# Patient Record
Sex: MALE | Race: WHITE | URBAN - METROPOLITAN AREA
[De-identification: names, ages, dates, MRNs, and addresses within clinical notes are randomized per-mention and may not be internally consistent; named-entity substitution may affect disease eponyms.]

---

## 2018-05-31 ENCOUNTER — TRANSFERRED RECORDS (OUTPATIENT)
Dept: HEALTH INFORMATION MANAGEMENT | Facility: CLINIC | Age: 58
End: 2018-05-31

## 2018-06-01 ENCOUNTER — HOSPITAL ENCOUNTER (OUTPATIENT)
Facility: CLINIC | Age: 58
Setting detail: OBSERVATION
Discharge: HOME | End: 2018-06-02
Attending: INTERNAL MEDICINE | Admitting: INTERNAL MEDICINE
Payer: COMMERCIAL

## 2018-06-01 DIAGNOSIS — N30.90 CYSTITIS: Primary | ICD-10-CM

## 2018-06-01 PROBLEM — R33.9 URINARY RETENTION: Status: ACTIVE | Noted: 2018-06-01

## 2018-06-01 LAB
ALBUMIN SERPL-MCNC: 2.9 G/DL (ref 3.4–5)
ALP SERPL-CCNC: 93 U/L (ref 40–150)
ALT SERPL W P-5'-P-CCNC: 37 U/L (ref 0–70)
ANION GAP SERPL CALCULATED.3IONS-SCNC: 9 MMOL/L (ref 3–14)
AST SERPL W P-5'-P-CCNC: 16 U/L (ref 0–45)
BILIRUB SERPL-MCNC: 0.7 MG/DL (ref 0.2–1.3)
BUN SERPL-MCNC: 20 MG/DL (ref 7–30)
CALCIUM SERPL-MCNC: 8.7 MG/DL (ref 8.5–10.1)
CHLORIDE SERPL-SCNC: 103 MMOL/L (ref 94–109)
CO2 SERPL-SCNC: 23 MMOL/L (ref 20–32)
CREAT SERPL-MCNC: 1.24 MG/DL (ref 0.66–1.25)
ERYTHROCYTE [DISTWIDTH] IN BLOOD BY AUTOMATED COUNT: 13.7 % (ref 10–15)
GFR SERPL CREATININE-BSD FRML MDRD: 60 ML/MIN/1.7M2
GLUCOSE SERPL-MCNC: 124 MG/DL (ref 70–99)
HCT VFR BLD AUTO: 38.7 % (ref 40–53)
HGB BLD-MCNC: 13 G/DL (ref 13.3–17.7)
MCH RBC QN AUTO: 30.3 PG (ref 26.5–33)
MCHC RBC AUTO-ENTMCNC: 33.6 G/DL (ref 31.5–36.5)
MCV RBC AUTO: 90 FL (ref 78–100)
PLATELET # BLD AUTO: 335 10E9/L (ref 150–450)
POTASSIUM SERPL-SCNC: 4.1 MMOL/L (ref 3.4–5.3)
PROT SERPL-MCNC: 6.6 G/DL (ref 6.8–8.8)
RBC # BLD AUTO: 4.29 10E12/L (ref 4.4–5.9)
SODIUM SERPL-SCNC: 135 MMOL/L (ref 133–144)
WBC # BLD AUTO: 24.8 10E9/L (ref 4–11)

## 2018-06-01 PROCEDURE — G0378 HOSPITAL OBSERVATION PER HR: HCPCS

## 2018-06-01 PROCEDURE — 36415 COLL VENOUS BLD VENIPUNCTURE: CPT | Performed by: INTERNAL MEDICINE

## 2018-06-01 PROCEDURE — 85027 COMPLETE CBC AUTOMATED: CPT | Performed by: INTERNAL MEDICINE

## 2018-06-01 PROCEDURE — 25000128 H RX IP 250 OP 636: Performed by: INTERNAL MEDICINE

## 2018-06-01 PROCEDURE — 96374 THER/PROPH/DIAG INJ IV PUSH: CPT

## 2018-06-01 PROCEDURE — 25000125 ZZHC RX 250

## 2018-06-01 PROCEDURE — 99223 1ST HOSP IP/OBS HIGH 75: CPT | Mod: AI | Performed by: INTERNAL MEDICINE

## 2018-06-01 PROCEDURE — 25000132 ZZH RX MED GY IP 250 OP 250 PS 637: Performed by: INTERNAL MEDICINE

## 2018-06-01 PROCEDURE — 25000125 ZZHC RX 250: Performed by: INTERNAL MEDICINE

## 2018-06-01 PROCEDURE — 80053 COMPREHEN METABOLIC PANEL: CPT | Performed by: INTERNAL MEDICINE

## 2018-06-01 RX ORDER — HYDROMORPHONE HYDROCHLORIDE 1 MG/ML
.3-.5 INJECTION, SOLUTION INTRAMUSCULAR; INTRAVENOUS; SUBCUTANEOUS
Status: DISCONTINUED | OUTPATIENT
Start: 2018-06-01 | End: 2018-06-02 | Stop reason: HOSPADM

## 2018-06-01 RX ORDER — LIDOCAINE 40 MG/G
CREAM TOPICAL
Status: DISCONTINUED | OUTPATIENT
Start: 2018-06-01 | End: 2018-06-02 | Stop reason: HOSPADM

## 2018-06-01 RX ORDER — PROCHLORPERAZINE 25 MG
25 SUPPOSITORY, RECTAL RECTAL EVERY 12 HOURS PRN
Status: DISCONTINUED | OUTPATIENT
Start: 2018-06-01 | End: 2018-06-02 | Stop reason: HOSPADM

## 2018-06-01 RX ORDER — NICOTINE 21 MG/24HR
1 PATCH, TRANSDERMAL 24 HOURS TRANSDERMAL DAILY
Status: DISCONTINUED | OUTPATIENT
Start: 2018-06-01 | End: 2018-06-02 | Stop reason: HOSPADM

## 2018-06-01 RX ORDER — MAGNESIUM SULFATE HEPTAHYDRATE 40 MG/ML
4 INJECTION, SOLUTION INTRAVENOUS EVERY 4 HOURS PRN
Status: DISCONTINUED | OUTPATIENT
Start: 2018-06-01 | End: 2018-06-02 | Stop reason: HOSPADM

## 2018-06-01 RX ORDER — POTASSIUM CHLORIDE 29.8 MG/ML
20 INJECTION INTRAVENOUS
Status: DISCONTINUED | OUTPATIENT
Start: 2018-06-01 | End: 2018-06-02 | Stop reason: HOSPADM

## 2018-06-01 RX ORDER — AMOXICILLIN 250 MG
1 CAPSULE ORAL 2 TIMES DAILY PRN
Status: DISCONTINUED | OUTPATIENT
Start: 2018-06-01 | End: 2018-06-02 | Stop reason: HOSPADM

## 2018-06-01 RX ORDER — AMOXICILLIN 250 MG
1 CAPSULE ORAL 2 TIMES DAILY
Status: DISCONTINUED | OUTPATIENT
Start: 2018-06-01 | End: 2018-06-02 | Stop reason: HOSPADM

## 2018-06-01 RX ORDER — ACETAMINOPHEN 650 MG/1
650 SUPPOSITORY RECTAL EVERY 4 HOURS PRN
Status: DISCONTINUED | OUTPATIENT
Start: 2018-06-01 | End: 2018-06-02 | Stop reason: HOSPADM

## 2018-06-01 RX ORDER — BISACODYL 10 MG
10 SUPPOSITORY, RECTAL RECTAL DAILY PRN
Status: DISCONTINUED | OUTPATIENT
Start: 2018-06-01 | End: 2018-06-02 | Stop reason: HOSPADM

## 2018-06-01 RX ORDER — ATORVASTATIN CALCIUM 20 MG/1
20 TABLET, FILM COATED ORAL DAILY
Status: DISCONTINUED | OUTPATIENT
Start: 2018-06-01 | End: 2018-06-02 | Stop reason: HOSPADM

## 2018-06-01 RX ORDER — NALOXONE HYDROCHLORIDE 0.4 MG/ML
.1-.4 INJECTION, SOLUTION INTRAMUSCULAR; INTRAVENOUS; SUBCUTANEOUS
Status: DISCONTINUED | OUTPATIENT
Start: 2018-06-01 | End: 2018-06-02 | Stop reason: HOSPADM

## 2018-06-01 RX ORDER — AMOXICILLIN 250 MG
2 CAPSULE ORAL 2 TIMES DAILY
Status: DISCONTINUED | OUTPATIENT
Start: 2018-06-01 | End: 2018-06-02 | Stop reason: HOSPADM

## 2018-06-01 RX ORDER — PROCHLORPERAZINE MALEATE 10 MG
10 TABLET ORAL EVERY 6 HOURS PRN
Status: DISCONTINUED | OUTPATIENT
Start: 2018-06-01 | End: 2018-06-02 | Stop reason: HOSPADM

## 2018-06-01 RX ORDER — POTASSIUM CHLORIDE 1500 MG/1
20-40 TABLET, EXTENDED RELEASE ORAL
Status: DISCONTINUED | OUTPATIENT
Start: 2018-06-01 | End: 2018-06-02 | Stop reason: HOSPADM

## 2018-06-01 RX ORDER — LOSARTAN POTASSIUM 50 MG/1
50 TABLET ORAL DAILY
Status: DISCONTINUED | OUTPATIENT
Start: 2018-06-01 | End: 2018-06-02 | Stop reason: HOSPADM

## 2018-06-01 RX ORDER — AMOXICILLIN 250 MG
2 CAPSULE ORAL 2 TIMES DAILY PRN
Status: DISCONTINUED | OUTPATIENT
Start: 2018-06-01 | End: 2018-06-02 | Stop reason: HOSPADM

## 2018-06-01 RX ORDER — ATROPA BELLADONNA AND OPIUM 16.2; 6 MG/1; MG/1
60 SUPPOSITORY RECTAL EVERY 8 HOURS PRN
Status: DISCONTINUED | OUTPATIENT
Start: 2018-06-01 | End: 2018-06-02 | Stop reason: HOSPADM

## 2018-06-01 RX ORDER — ONDANSETRON 2 MG/ML
4 INJECTION INTRAMUSCULAR; INTRAVENOUS EVERY 6 HOURS PRN
Status: DISCONTINUED | OUTPATIENT
Start: 2018-06-01 | End: 2018-06-02 | Stop reason: HOSPADM

## 2018-06-01 RX ORDER — CEFTRIAXONE 1 G/1
1 INJECTION, POWDER, FOR SOLUTION INTRAMUSCULAR; INTRAVENOUS EVERY 24 HOURS
Status: DISCONTINUED | OUTPATIENT
Start: 2018-06-01 | End: 2018-06-02 | Stop reason: HOSPADM

## 2018-06-01 RX ORDER — POTASSIUM CHLORIDE 1.5 G/1.58G
20-40 POWDER, FOR SOLUTION ORAL
Status: DISCONTINUED | OUTPATIENT
Start: 2018-06-01 | End: 2018-06-02 | Stop reason: HOSPADM

## 2018-06-01 RX ORDER — POLYETHYLENE GLYCOL 3350 17 G/17G
17 POWDER, FOR SOLUTION ORAL DAILY PRN
Status: DISCONTINUED | OUTPATIENT
Start: 2018-06-01 | End: 2018-06-02 | Stop reason: HOSPADM

## 2018-06-01 RX ORDER — POTASSIUM CL/LIDO/0.9 % NACL 10MEQ/0.1L
10 INTRAVENOUS SOLUTION, PIGGYBACK (ML) INTRAVENOUS
Status: DISCONTINUED | OUTPATIENT
Start: 2018-06-01 | End: 2018-06-02 | Stop reason: HOSPADM

## 2018-06-01 RX ORDER — ONDANSETRON 4 MG/1
4 TABLET, ORALLY DISINTEGRATING ORAL EVERY 6 HOURS PRN
Status: DISCONTINUED | OUTPATIENT
Start: 2018-06-01 | End: 2018-06-02 | Stop reason: HOSPADM

## 2018-06-01 RX ORDER — ACETAMINOPHEN 325 MG/1
650 TABLET ORAL EVERY 4 HOURS PRN
Status: DISCONTINUED | OUTPATIENT
Start: 2018-06-01 | End: 2018-06-02 | Stop reason: HOSPADM

## 2018-06-01 RX ORDER — CITALOPRAM HYDROBROMIDE 20 MG/1
40 TABLET ORAL DAILY
Status: DISCONTINUED | OUTPATIENT
Start: 2018-06-01 | End: 2018-06-02 | Stop reason: HOSPADM

## 2018-06-01 RX ORDER — HYDROCODONE BITARTRATE AND ACETAMINOPHEN 5; 325 MG/1; MG/1
1 TABLET ORAL EVERY 6 HOURS PRN
COMMUNITY

## 2018-06-01 RX ORDER — POTASSIUM CHLORIDE 7.45 MG/ML
10 INJECTION INTRAVENOUS
Status: DISCONTINUED | OUTPATIENT
Start: 2018-06-01 | End: 2018-06-02 | Stop reason: HOSPADM

## 2018-06-01 RX ORDER — OXYCODONE HYDROCHLORIDE 5 MG/1
5-10 TABLET ORAL
Status: DISCONTINUED | OUTPATIENT
Start: 2018-06-01 | End: 2018-06-02 | Stop reason: HOSPADM

## 2018-06-01 RX ORDER — BISACODYL 10 MG
10 SUPPOSITORY, RECTAL RECTAL DAILY PRN
Status: DISCONTINUED | OUTPATIENT
Start: 2018-06-01 | End: 2018-06-01

## 2018-06-01 RX ORDER — POLYETHYLENE GLYCOL 3350 17 G/17G
17 POWDER, FOR SOLUTION ORAL DAILY PRN
Status: DISCONTINUED | OUTPATIENT
Start: 2018-06-01 | End: 2018-06-01

## 2018-06-01 RX ADMIN — ONDANSETRON 4 MG: 4 TABLET, ORALLY DISINTEGRATING ORAL at 05:34

## 2018-06-01 RX ADMIN — LIDOCAINE HYDROCHLORIDE 10 ML: 20 JELLY TOPICAL at 07:58

## 2018-06-01 RX ADMIN — CEFTRIAXONE 1 G: 1 INJECTION, POWDER, FOR SOLUTION INTRAMUSCULAR; INTRAVENOUS at 17:57

## 2018-06-01 RX ADMIN — SODIUM CHLORIDE 1000 ML: 9 INJECTION, SOLUTION INTRAVENOUS at 05:03

## 2018-06-01 RX ADMIN — HYDROMORPHONE HYDROCHLORIDE 0.5 MG: 1 INJECTION, SOLUTION INTRAMUSCULAR; INTRAVENOUS; SUBCUTANEOUS at 06:01

## 2018-06-01 RX ADMIN — LIDOCAINE HYDROCHLORIDE: 20 JELLY TOPICAL at 12:31

## 2018-06-01 RX ADMIN — ACETAMINOPHEN 650 MG: 325 TABLET, FILM COATED ORAL at 20:54

## 2018-06-01 RX ADMIN — CITALOPRAM HYDROBROMIDE 40 MG: 20 TABLET ORAL at 09:46

## 2018-06-01 RX ADMIN — ACETAMINOPHEN 650 MG: 325 TABLET, FILM COATED ORAL at 05:34

## 2018-06-01 RX ADMIN — Medication 5 MG: at 05:29

## 2018-06-01 RX ADMIN — SENNOSIDES AND DOCUSATE SODIUM 2 TABLET: 8.6; 5 TABLET ORAL at 09:46

## 2018-06-01 RX ADMIN — LOSARTAN POTASSIUM 50 MG: 50 TABLET ORAL at 09:46

## 2018-06-01 RX ADMIN — Medication 1 MG: at 20:54

## 2018-06-01 RX ADMIN — OXYCODONE HYDROCHLORIDE 5 MG: 5 TABLET ORAL at 05:29

## 2018-06-01 RX ADMIN — ATROPA BELLADONNA AND OPIUM 1 SUPPOSITORY: 16.2; 6 SUPPOSITORY RECTAL at 06:21

## 2018-06-01 RX ADMIN — NICOTINE 1 PATCH: 21 PATCH, EXTENDED RELEASE TRANSDERMAL at 09:45

## 2018-06-01 RX ADMIN — ATORVASTATIN CALCIUM 20 MG: 20 TABLET, FILM COATED ORAL at 09:46

## 2018-06-01 NOTE — H&P
[facility]    History and Physical  Hospitalist       Date of Admission:  6/1/2018    Assessment & Plan   Kandi Ozuna is a 57 year old male who presents as a direct admission from [place], [place] with complaints of suprapubic pain, fever to 101.7, nausea in the setting of radical prostatectomy by Dr. Guaman [facility] 10 days ago and Medina catheter removal earlier 5/31.    Abdominal pain: Suspect this is secondary to catheter related urinary tract infection as well as bladder spasm and postoperative pain from recent radical prostatectomy.  CT urogram reported without overt urine leak, though small amount of pelvic free fluid cannot rule out leak.  Suspected catheter related urinary tract infection: Patient with pyuria, fever to 101.7 reported.  Patient states he felt febrile even prior to Medina catheter removal 5/31/18 in urology office.  -Ceftriaxone 1 g every 24 hours  -[facility] consulted  -No Mednia catheter is being placed.  Any recommendation for placement of Medina catheter should come directly from urology given recent operative intervention.  -Adjustment of pain control regimen as per surgical service  -B & O suppositories  -Acetaminophen, oral oxycodone, IV Dilaudid if needed.  Would minimize IV narcotic use in order to transition to discharge.    Prostate adenocarcinoma: Hi 7, 0/4 lymph nodes involved.  As above, underwent radical prostatectomy by Dr. Guaman at [facility] 5/21/18.    Common bile duct dilation: It is possible that patient has an unrelated cholecystitis resulting in fever and nausea/abdominal pain.  This seems less likely given patient's abdominal discomfort is suprapubic and clearly associated with efforts to urinate.  Suspect common bile duct dilation without findings of stone noted on CT report is an incidental finding related to prolonged fasting.  -Hepatic panel pending for this a.m.  -Remains on ceftriaxone 1 g every 24 hours for  urinary tract infection    Hypertension:  -Continue Cozaar 50 mg daily    Hyperlipidemia:  -Continue atorvastatin 20 milligrams daily    Anxiety:  -Continue Celexa 40 mg daily  -1 time 5 mg Valium dose given on admission.    Tobacco dependence: Patient quit smoking approximately 10 days ago prior to his prostatectomy surgery.  -Continue nicotine patch at 21 mg per day     # Pain Assessment:   - Kandi is experiencing pain due to postoperative prostatectomy pain as well as what I suspect to be bladder spasm with cystitis. Pain management was discussed and the plan was created in a collaborative fashion.  Kandi's response to the current recommendations: compliant  Acetaminophen, oxycodone, IV Dilaudid as needed.    DVT Prophylaxis: Pneumatic Compression Devices    Code Status: Full Code    Disposition: Expected discharge in likely 1-2 days pending outside culture results and pain control    Brandon Blackwell [place]    Primary Care Physician   No primary care provider on file.    Chief Complaint   Abdominal pain    History is obtained from the patient, chart review, discussion with Dr. Armenta at [facility], review of outside records including recent admission for radical prostatectomy and pathology results    History of Present Illness   Kandi Ozuna is a 57 year old male who presents with fever, lower abdominal pain in the setting of recent radical prostatectomy 5/22/18 and Medina catheter removal 5/31/18.  Patient underwent radical [facility] at [facility] approximately 10 days ago.  Hospitalized for 2 days given difficulty with pain control prior to discharge.  Patient states that his appetite was poor and he had some ongoing abdominal pain which slowly improved following discharge, was feeling fairly well, and believes that he was recovering from surgery better than others might be expected to.  5/31, however, as patient was driving from [place] to [place] for  "urology follow-up, noted feeling somewhat febrile.  At urology appointment, patient had his Medina catheter removed and underwent a CT of his abdomen and pelvis.  Exact details of this are not clear as I am unable to access [facility] records currently.  Patient states he reported his fever, though no cultures were obtained to his knowledge.  Driving home from his urology appointment, patient stopped in [place] to get gas when he had sudden lower abdominal pain that caused him to double over.  Patient states the onset of pain was related to feeling as though he needed to urinate, though he was unable to do so.  States that this pain slowly improved over the course of one half hour.  Was unable to drive during this period given degree of discomfort.     Pain persisted, pt developed fever to 101.7 as measured at home.  Reports additional bouts of \"12/10\" pain which he relates to attempting to begin urination. Took tylenol, no longer febrile, presented to [facility] [facility]    Post void residual of 50 ml, difficult to control pain even with IV morphine and dilaudid. CT urogram w/o overt leak.  [facility] aware of patient from outside ER. Transferred to [facility] for urology evaluation.    Pt noted to have a leukocytosis to 28K as well as reported fever. Concern for CAUTI with pyuria noted at outside hospital.    Past Medical History    I have reviewed this patient's medical history and updated it with pertinent information if needed.   Hypertension, hyperlipidemia, Hi 7 prostate cancer    Past Surgical History   I have reviewed this patient's surgical history and updated it with pertinent information if needed.  Radical prostatectomy 5/22/18    Prior to Admission Medications   Cozaar  Celexa  Norco  Atorvastatin    Allergies   No Known Allergies    Social History   I have reviewed this patient's social history and updated it with pertinent information if needed. Kandi Ozuna quit smoking 1ppd approximately 2 " weeks ago.  Has been abstinent from alcohol for the past greater than 20 years.    Family History   I have reviewed this patient's family history and updated it with pertinent information if needed.   father with a history of lung cancer  Mother with significant COPD    Review of Systems   The 10 point Review of Systems is negative other than noted in the HPI or here.  Fever  No cough or shortness of breath (did have a cough in the immediate several days post op and post tobacco cessation)    Physical Exam                      Vital Signs with Ranges  Temp:  [98.5  F (36.9  C)-98.9  F (37.2  C)] 98.5  F (36.9  C)  Pulse:  [83-94] 94  Resp:  [16] 16  BP: (113-143)/(81-90) 143/90  SpO2:  [92 %] 92 %  0 lbs 0 oz    Constitutional: mild discomfort, alert, conversant  Eyes: no scleral icterus or injection  HEENT: moist mucous membranes  Respiratory: breath sounds clear bilaterally to auscultation, no wheezes, no crackles. Slightly prolonged expiratory phase  Cardiovascular: regular rate and rhythm, no murmur, distant  GI: abdomen soft, no upper quadrant tenderness to palpation.  Lower abdominal tenderness, no peritoneal signs  Lymph/Hematologic: no lower extremity swelling  Skin: no rashes  Musculoskeletal: muscular tone intact in all extremities  Neurologic: mental status grossly intact, no focal deficits, alert  Psychiatric: normal affect    Data   Data reviewed today:  I personally reviewed no images or EKG's today.  CT report from [place] system reviewed (post surgical inflammation, CBD of 11mm, small free fluid in pelvis)

## 2018-06-01 NOTE — PHARMACY-ADMISSION MEDICATION HISTORY
Admission medication history interview status for the 6/1/2018  admission is complete. See EPIC admission navigator for prior to admission medications     Medication history source reliability:Good    Actions taken by pharmacist (provider contacted, etc):  Interviewed patient and verified with ED Transfer Summary and pictures of med bottles on phone     Additional medication history information not noted on PTA med list :None    Medication reconciliation/reorder completed by provider prior to medication history? Yes    Time spent in this activity: 20 min    Prior to Admission medications    Medication Sig Last Dose Taking? Auth Provider   ATORVASTATIN CALCIUM PO Take 20 mg by mouth every evening 5/30/2018 at pm Yes Unknown, Entered By History   CITALOPRAM HYDROBROMIDE PO Take 40 mg by mouth every evening 5/30/2018 at pm Yes Unknown, Entered By History   HYDROcodone-acetaminophen (NORCO) 5-325 MG per tablet Take 1 tablet by mouth every 6 hours as needed for pain 5/23/2018 Yes Unknown, Entered By History   LOSARTAN POTASSIUM PO Take 50 mg by mouth daily 5/31/2018 at am Yes Unknown, Entered By History

## 2018-06-01 NOTE — PROGRESS NOTES
A/Ox4, VSS on RA. Abd mildly distended with lap sites. Steri strips intact, no drainage. PVR at 0655 was 415, will attempt to void. Pain 7-8/10, given IV, PO, B&O supp, ice and heat with minimal relief at this time. R PIV from [place] has blood return and is OK to use. Fluid bolus 1000ml complete. Urology to see this am.

## 2018-06-01 NOTE — PLAN OF CARE
Problem: Patient Care Overview  Goal: Plan of Care/Patient Progress Review  Outcome: No Change  Medina placed for urinary retention this morning by urology PA.  Urine has been clear vijaya.  No further c/o discomfort or pain.  Tmax 99.0.   WBC 24.8 which is decreased from WBC at [place] [facility] last evening. Continue IV rocephin.  Up ind in room.  Tolerating reg diet.  Will continue to monitor.

## 2018-06-01 NOTE — CONSULTS
[facility]    Urology Consultation     Date of Admission:  6/1/2018    Assessment & Plan   Kandi Ozuna is a 57 year old male who was admitted on 6/1/2018. I was asked to see the patient for suprapubic pain, fevers and elevated WBC s/p RALP on 5/21 with Dr. Guaman, becerra removed in office 5/31. Urinary retention, possible urine leak, possible UTI    Plan: Becerra replaced with 400cc output; continue becerra and follow up with Dr. Guaman next week for TOV.   Treat for UTI, recommend 7 days abx   Likely d/c home tomorrow if afebrile and labs normalizing     Mary Tripathi PA-C  [facility]  [address] [place] [id number]  [phone]  [email or web address]  Text Page (7am to 5pm)    Code Status    Full Code    Reason for Consult   Reason for consult: I was asked by Dr. Calvo to evaluate this patient for urinary retention and possible UTI s/p RALP and becerra removal.    Primary Care Physician   No primary care provider on file.    Chief Complaint   Suprapubic pain, fever    History is obtained from the patient    History of Present Illness   Kandi Ozuna is a 57 year old male who recently underwent RALP with Dr. Guaman on 5/21, becerra removed yesterday (5/31) without issue. CT cystogram obtained prior to becerra removal was without evidence of anastomosis leak. Patient reports that he stopped for gas on his way home following his appointment and felt the urge to void but was unable to do so and then began experiencing severe suprapubic pain. He presented to the ED in [place] yesterday evening with a fever of 101F and a reported leukocytosis of 28 and pyuria although I am unable to access these records currently. He was transferred to [facility] for further evaluation and treatment this AM. He was given a dose of IV rocephin prior to transfer.     Since admission to [facility], he was been afebrile. This AM, PVR was >400cc and he was straight cathed for 500cc clear  urine. Repeat labs obtained this AM with WBC 24, creat 1.24, hb 13. Reports he feels better now that his bladder is empty. Denies gross hematuria, current fever/chills, nausea/vomiting, SOB, calve tenderness, flank pain.     Past Medical History   I have reviewed this patient's medical history and updated it with pertinent information if needed.   No past medical history on file.    Past Surgical History   I have reviewed this patient's surgical history and updated it with pertinent information if needed.  No past surgical history on file.    Prior to Admission Medications   Prior to Admission Medications   Prescriptions Last Dose Informant Patient Reported? Taking?   ATORVASTATIN CALCIUM PO 5/30/2018 at pm Self Yes Yes   Sig: Take 20 mg by mouth every evening   CITALOPRAM HYDROBROMIDE PO 5/30/2018 at pm Self Yes Yes   Sig: Take 40 mg by mouth every evening   HYDROcodone-acetaminophen (NORCO) 5-325 MG per tablet 5/23/2018 Self Yes Yes   Sig: Take 1 tablet by mouth every 6 hours as needed for pain   LOSARTAN POTASSIUM PO 5/31/2018 at am Self Yes Yes   Sig: Take 50 mg by mouth daily      Facility-Administered Medications: None     Allergies   No Known Allergies    Social History   I have reviewed this patient's social history and updated it with pertinent information if needed. Kandi Ozuna      Family History   I have reviewed this patient's family history and updated it with pertinent information if needed.   No family history on file.    Review of Systems   The 10 point Review of Systems is negative other than noted in the HPI or here.     Physical Exam   Temp: 98.5  F (36.9  C) Temp src: Oral BP: 143/90 Pulse: 94   Resp: 16 SpO2: 92 % O2 Device: None (Room air)    Vital Signs with Ranges  Temp:  [98.5  F (36.9  C)-98.9  F (37.2  C)] 98.5  F (36.9  C)  Pulse:  [83-94] 94  Resp:  [16] 16  BP: (113-143)/(81-90) 143/90  SpO2:  [92 %] 92 %  143 lbs 0 oz    Constitutional: Sitting up in bed, NAD  Eyes: no  icterus  ENT: normocephalic, atraumatic   Respiratory: breathing unlabored   Cardiovascular: chest wall symmetric   GI: soft, NT, ND. No CVAT  Lymph/Hematologic: no pedal edema or calve tenderness  Genitourinary: no penoscrotal edema or tenderness. Becerra placed with 400cc clear urine on return.   Skin: well perfused   Neurologic: no focal deficits  Neuropsychiatric: A&Ox3    PROCEDURE: after sterile prep of the meatus with betadine, 20ml of viscous lidocaine jelly was introduced into the urethral and allowed approx 2-3 minutes to take effect. A 16F becerra catheter was then lubricated and inserted into the urethra and passed into the bladder without resistance. Clear urine was noted on return and the balloon was inflated with 10cc NS. 400cc of clear urine drained upon becerra insertion.     Data   Results for orders placed or performed during the hospital encounter of 06/01/18 (from the past 24 hour(s))   Comprehensive metabolic panel   Result Value Ref Range    Sodium 135 133 - 144 mmol/L    Potassium 4.1 3.4 - 5.3 mmol/L    Chloride 103 94 - 109 mmol/L    Carbon Dioxide 23 20 - 32 mmol/L    Anion Gap 9 3 - 14 mmol/L    Glucose 124 (H) 70 - 99 mg/dL    Urea Nitrogen 20 7 - 30 mg/dL    Creatinine 1.24 0.66 - 1.25 mg/dL    GFR Estimate 60 (L) >60 mL/min/1.7m2    GFR Estimate If Black 72 >60 mL/min/1.7m2    Calcium 8.7 8.5 - 10.1 mg/dL    Bilirubin Total 0.7 0.2 - 1.3 mg/dL    Albumin 2.9 (L) 3.4 - 5.0 g/dL    Protein Total 6.6 (L) 6.8 - 8.8 g/dL    Alkaline Phosphatase 93 40 - 150 U/L    ALT 37 0 - 70 U/L    AST 16 0 - 45 U/L   CBC with platelets   Result Value Ref Range    WBC 24.8 (H) 4.0 - 11.0 10e9/L    RBC Count 4.29 (L) 4.4 - 5.9 10e12/L    Hemoglobin 13.0 (L) 13.3 - 17.7 g/dL    Hematocrit 38.7 (L) 40.0 - 53.0 %    MCV 90 78 - 100 fl    MCH 30.3 26.5 - 33.0 pg    MCHC 33.6 31.5 - 36.5 g/dL    RDW 13.7 10.0 - 15.0 %    Platelet Count 335 150 - 450 10e9/L

## 2018-06-01 NOTE — CONSULTS
"BRIEF NUTRITION ASSESSMENT      REASON FOR ASSESSMENT:  Nutrition Admission Screen - Unintentional weight loss of 10# or more in past 2 months      CURRENT DIET AND INTAKE:  Diet:  Regular              Chart reviewed  Visited with pt this morning  \"I probably will be going home tomorrow\"  Pt tells me that he is feeling hungry and is waiting for his breakfast - \"am feeling much better this morning, after that becerra thing was fixed\"    He follows a regular diet at home  Typically has a good appetite      ANTHROPOMETRICS:  Height: 5'5\"  Weight: (6/1) 64.9 kg / 143#  BMI: 23.8 kg/m2  IBW:  61.8 kg  Weight Status: Normal BMI  %IBW: 105%  Weight History: Pt tells me that his usual wt is in the 140s.  Per Care Everywhere, his wt on (5/21/18) was 146#.    LABS:  Labs noted    MALNUTRITION:  Patient does not meet two of the following criteria necessary for diagnosing malnutrition: significant weight loss, reduced intake, subcutaneous fat loss, muscle loss or fluid retention    NUTRITION INTERVENTION:  Nutrition Diagnosis:  No nutrition diagnosis at this time.    Implementation:  Nutrition Education ---> Reviewed current diet order and meal ordering process.    FOLLOW UP/MONITORING:   Will re-evaluate in 7 - 10 days, or sooner, if re-consulted.          "

## 2018-06-01 NOTE — IP AVS SNAPSHOT
[address], [address]    [place] [address]    Phone:  [phone]                                       After Visit Summary   6/1/2018    Kandi Ozuna    MRN: [id number]           After Visit Summary Signature Page     I have received my discharge instructions, and my questions have been answered. I have discussed any challenges I see with this plan with the nurse or doctor.    ..........................................................................................................................................  Patient/Patient Representative Signature      ..........................................................................................................................................  Patient Representative Print Name and Relationship to Patient    ..................................................               ................................................  Date                                            Time    ..........................................................................................................................................  Reviewed by Signature/Title    ...................................................              ..............................................  Date                                                            Time

## 2018-06-01 NOTE — IP AVS SNAPSHOT
MRN:[id number]                      After Visit Summary   6/1/2018    Kandi Ozuna    MRN: [id number]           Thank you!     Thank you for choosing [facility] for your care. Our goal is always to provide you with excellent care. Hearing back from our patients is one way we can continue to improve our services. Please take a few minutes to complete the written survey that you may receive in the mail after you visit with us. Thank you!        Patient Information     Date Of Birth          1960        Designated Caregiver       Most Recent Value    Caregiver    Will someone help with your care after discharge? yes    Name of designated caregiver Viky    Phone number of caregiver [phone]    Caregiver address see chart      About your hospital stay     You were admitted on:  June 1, 2018 You last received care in the:  [place] [address] [facility]    You were discharged on:  June 2, 2018        Reason for your hospital stay       UTI                  Who to Call     For medical emergencies, please call 911.  For non-urgent questions about your medical care, please call your primary care provider or clinic, [phone]          Attending Provider     Provider Specialty    Calvo, Brandon Blackwell MD Internal Medicine    [facility]       Primary Care Provider Office Phone # Fax #    [place] [phone]      After Care Instructions     Activity       Your activity upon discharge:LIMITED X 1-2 WEEKS            Diet       Follow this diet upon discharge: REGULAR                  Follow-up Appointments     Follow-up and recommended labs and tests        SEE DR ERIC IN 1 WEEK ,ALREADY HAS AN APPT                  Pending Results     No orders found for last 3 day(s).            Statement of Approval     Ordered          06/02/18 0909  I have reviewed and agree with all the recommendations and orders detailed in this document.  EFFECTIVE NOW    "  Approved and electronically signed by:  Saravanan De Souza MD             Admission Information     Date & Time Provider Department Dept. Phone    2018 Kevin Bernstein MD [place] [address] [facility] [phone]      Your Vitals Were     Blood Pressure Pulse Temperature Respirations Height Weight    111/73 (BP Location: Right arm) 82 98.9  F (37.2  C) (Oral) 16 1.651 m (5' 5\") 65 kg (143 lb 6.4 oz)    Pulse Oximetry BMI (Body Mass Index)                95% 23.86 kg/m2          MyChart Information     [facility] lets you send messages to your doctor, view your test results, renew your prescriptions, schedule appointments and more. To sign up, go to [email or web address] . Click on \"Log in\" on the left side of the screen, which will take you to the Welcome page. Then click on \"Sign up Now\" on the right side of the page.     You will be asked to enter the access code listed below, as well as some personal information. Please follow the directions to create your username and password.     Your access code is: [email or web address]  Expires: 2018 11:47 AM     Your access code will  in 90 days. If you need help or a new code, please call your [facility] clinic or [phone].        Care EveryWhere ID     This is your Care EveryWhere ID. This could be used by other organizations to access your [facility] medical records  [id number]        Equal Access to Services     [facility] CenterALFONSO : Hadii noah ku hadasho Soomaali, waaxda luqadaha, qaybta kaalmada dayronegyada, ngozi tesfaye . So [facility] [phone].    ATENCIÓN: Si habla español, tiene a teixeira disposición servicios gratuitos de asistencia lingüística. Llame al [phone].    We comply with applicable federal civil rights laws and [place] laws. We do not discriminate on the basis of race, color, national origin, age, disability, sex, sexual orientation, or gender identity.               Review of your medicines      START taking        Dose " / Directions    sulfamethoxazole-trimethoprim 800-160 MG per tablet   Commonly known as:  BACTRIM DS/SEPTRA DS        Dose:  1 tablet   Take 1 tablet by mouth 2 times daily   Quantity:  14 tablet   Refills:  0         CONTINUE these medicines which have NOT CHANGED        Dose / Directions    ATORVASTATIN CALCIUM PO        Dose:  20 mg   Take 20 mg by mouth every evening   Refills:  0       CITALOPRAM HYDROBROMIDE PO   Indication:  Social Anxiety Disorder        Dose:  40 mg   Take 40 mg by mouth every evening   Refills:  0       HYDROcodone-acetaminophen 5-325 MG per tablet   Commonly known as:  NORCO        Dose:  1 tablet   Take 1 tablet by mouth every 6 hours as needed for pain   Refills:  0       LOSARTAN POTASSIUM PO        Dose:  50 mg   Take 50 mg by mouth daily   Refills:  0            Where to get your medicines      These medications were sent to [facility]/pharmacy #[id number] - [place], [place] - [address] [address] [facility] [address]    Hours:  24-hours Phone:  [phone]     sulfamethoxazole-trimethoprim 800-160 MG per tablet                Protect others around you: Learn how to safely use, store and throw away your medicines at www.disposemymeds.org.        ANTIBIOTIC INSTRUCTION     You've Been Prescribed an Antibiotic - Now What?  Your healthcare team thinks that you or your loved one might have an infection. Some infections can be treated with antibiotics, which are powerful, life-saving drugs. Like all medications, antibiotics have side effects and should only be used when necessary. There are some important things you should know about your antibiotic treatment.      Your healthcare team may run tests before you start taking an antibiotic.    Your team may take samples (e.g., from your blood, urine or other areas) to run tests to look for bacteria. These test can be important to determine if you need an antibiotic at all and, if you do, which antibiotic will work best.      Within a few days,  your healthcare team might change or even stop your antibiotic.    Your team may start you on an antibiotic while they are working to find out what is making you sick.    Your team might change your antibiotic because test results show that a different antibiotic would be better to treat your infection.    In some cases, once your team has more information, they learn that you do not need an antibiotic at all. They may find out that you don't have an infection, or that the antibiotic you're taking won't work against your infection. For example, an infection caused by a virus can't be treated with antibiotics. Staying on an antibiotic when you don't need it is more likely to be harmful than helpful.      You may experience side effects from your antibiotic.    Like all medications, antibiotics have side effects. Some of these can be serious.    Let you healthcare team know if you have any known allergies when you are admitted to the hospital.    One significant side effect of nearly all antibiotics is the risk of severe and sometimes deadly diarrhea caused by Clostridium difficile (C. Difficile). This occurs when a person takes antibiotics because some good germs are destroyed. Antibiotic use allows C. diificile to take over, putting patients at high risk for this serious infection.    As a patient or caregiver, it is important to understand your or your loved one's antibiotic treatment. It is especially important for caregivers to speak up when patients can't speak for themselves. Here are some important questions to ask your healthcare team.    What infection is this antibiotic treating and how do you know I have that infection?    What side effects might occur from this antibiotic?    How long will I need to take this antibiotic?    Is it safe to take this antibiotic with other medications or supplements (e.g., vitamins) that I am taking?     Are there any special directions I need to know about taking this  antibiotic? For example, should I take it with food?    How will I be monitored to know whether my infection is responding to the antibiotic?    What tests may help to make sure the right antibiotic is prescribed for me?      Information provided by:  www.cdc.gov/getsmart  U.S. Department of Health and Human Services  Centers for disease Control and Prevention  National Center for Emerging and Zoonotic Infectious Diseases  Division of Healthcare Quality Promotion        Information about OPIOIDS     PRESCRIPTION OPIOIDS: WHAT YOU NEED TO KNOW   You have a prescription for an opioid (narcotic) pain medicine. Opioids can cause addiction. If you have a history of chemical dependency of any type, you are at a higher risk of becoming addicted to opioids. Only take this medicine after all other options have been tried. Take it for as short a time and as few doses as possible.     Do not:    Drive. If you drive while taking these medicines, you could be arrested for driving under the influence (DUI).    Operate heavy machinery    Do any other dangerous activities while taking these medicines.     Drink any alcohol while taking these medicines.      Take with any other medicines that contain acetaminophen. Read all labels carefully. Look for the word  acetaminophen  or  Tylenol.  Ask your pharmacist if you have questions or are unsure.    Store your pills in a secure place, locked if possible. We will not replace any lost or stolen medicine. If you don t finish your medicine, please throw away (dispose) as directed by your pharmacist. The [place] Pollution Control Agency has more information about safe disposal: https://www.pca.[facility].[place]/living-green/managing-unwanted-medications    All opioids tend to cause constipation. Drink plenty of water and eat foods that have a lot of fiber, such as fruits, vegetables, prune juice, apple juice and high-fiber cereal. Take a laxative (Miralax, milk of magnesia, Colace, Senna) if  you don t move your bowels at least every other day.              Medication List: This is a list of all your medications and when to take them. Check marks below indicate your daily home schedule. Keep this list as a reference.      Medications           Morning Afternoon Evening Bedtime As Needed    ATORVASTATIN CALCIUM PO   Take 20 mg by mouth every evening   Last time this was given:  20 mg on 6/2/2018  9:15 AM                                CITALOPRAM HYDROBROMIDE PO   Take 40 mg by mouth every evening   Last time this was given:  40 mg on 6/2/2018  9:15 AM                                HYDROcodone-acetaminophen 5-325 MG per tablet   Commonly known as:  NORCO   Take 1 tablet by mouth every 6 hours as needed for pain                                LOSARTAN POTASSIUM PO   Take 50 mg by mouth daily   Last time this was given:  50 mg on 6/2/2018  9:15 AM                                sulfamethoxazole-trimethoprim 800-160 MG per tablet   Commonly known as:  BACTRIM DS/SEPTRA DS   Take 1 tablet by mouth 2 times daily

## 2018-06-01 NOTE — UTILIZATION REVIEW
"  Admission Status; Secondary Review Determination         Under the authority of the Utilization Management Committee, the utilization review process indicated a secondary review on the above patient.  The review outcome is based on review of the medical records, discussions with staff, and applying clinical experience noted on the date of the review.        ()      Inpatient Status Appropriate - This patient's medical care is consistent with medical management for inpatient care and reasonable inpatient medical practice.      (X) Observation Status Appropriate - This patient does not meet hospital inpatient criteria and is placed in observation status. If this patient's primary payer is Medicare and was admitted as an inpatient, Condition Code 44 should be used and patient status changed to \"observation\".   () Admission Status NOT Appropriate - This patient's medical care is not consistent with medical management for Inpatient or Observation Status.          RATIONALE FOR DETERMINATION     57 year old male who recently underwent RALP with Dr. Guaman on 5/21, becerra removed yesterday (5/31) without issue. CT cystogram obtained prior to becerra removal was without evidence of anastomosis leak.  He presented with urgency and suprapubic pain.  He had a fever and elevated WBC.  He had pyuria at an outside hospital.  He was initiated on IV Rocephin.  A becerra was placed with improvement of his discomfort.  I would recommend placement in Observation status unless his pain worsens requiring parenteral pain medications or it is determined that he needs longer term IV antibiotics.  I spoke to Dr Bernstein in regard to this recommendation.     The severity of illness, intensity of service provided, expected LOS and risk for adverse outcome make the care complex, high risk and appropriate for hospital admission.  Amandeep in regard to this patient.      The information on this document is developed by the utilization review team in " order for the business office to ensure compliance.  This only denotes the appropriateness of proper admission status and does not reflect the quality of care rendered.         The definitions of Inpatient Status and Observation Status used in making the determination above are those provided in the CMS Coverage Manual, Chapter 1 and Chapter 6, section 70.4.      Sincerely,     Damir Whittington MD  Physician Advisor  Utilization Review/ Case Management  [facility].

## 2018-06-02 VITALS
RESPIRATION RATE: 16 BRPM | DIASTOLIC BLOOD PRESSURE: 73 MMHG | OXYGEN SATURATION: 95 % | TEMPERATURE: 98.9 F | BODY MASS INDEX: 23.89 KG/M2 | WEIGHT: 143.4 LBS | HEART RATE: 82 BPM | SYSTOLIC BLOOD PRESSURE: 111 MMHG | HEIGHT: 65 IN

## 2018-06-02 LAB
ANION GAP SERPL CALCULATED.3IONS-SCNC: 9 MMOL/L (ref 3–14)
BUN SERPL-MCNC: 15 MG/DL (ref 7–30)
CALCIUM SERPL-MCNC: 8.8 MG/DL (ref 8.5–10.1)
CHLORIDE SERPL-SCNC: 106 MMOL/L (ref 94–109)
CO2 SERPL-SCNC: 24 MMOL/L (ref 20–32)
CREAT SERPL-MCNC: 0.83 MG/DL (ref 0.66–1.25)
ERYTHROCYTE [DISTWIDTH] IN BLOOD BY AUTOMATED COUNT: 13.7 % (ref 10–15)
GFR SERPL CREATININE-BSD FRML MDRD: >90 ML/MIN/1.7M2
GLUCOSE SERPL-MCNC: 97 MG/DL (ref 70–99)
HCT VFR BLD AUTO: 34.4 % (ref 40–53)
HGB BLD-MCNC: 11.5 G/DL (ref 13.3–17.7)
MCH RBC QN AUTO: 30.4 PG (ref 26.5–33)
MCHC RBC AUTO-ENTMCNC: 33.4 G/DL (ref 31.5–36.5)
MCV RBC AUTO: 91 FL (ref 78–100)
PLATELET # BLD AUTO: 302 10E9/L (ref 150–450)
POTASSIUM SERPL-SCNC: 3.8 MMOL/L (ref 3.4–5.3)
RBC # BLD AUTO: 3.78 10E12/L (ref 4.4–5.9)
SODIUM SERPL-SCNC: 139 MMOL/L (ref 133–144)
WBC # BLD AUTO: 15.3 10E9/L (ref 4–11)

## 2018-06-02 PROCEDURE — 36415 COLL VENOUS BLD VENIPUNCTURE: CPT | Performed by: PHYSICIAN ASSISTANT

## 2018-06-02 PROCEDURE — 85027 COMPLETE CBC AUTOMATED: CPT | Performed by: PHYSICIAN ASSISTANT

## 2018-06-02 PROCEDURE — 99207 ZZC NO CHARGE VISIT/PATIENT NOT SEEN: CPT | Performed by: INTERNAL MEDICINE

## 2018-06-02 PROCEDURE — G0378 HOSPITAL OBSERVATION PER HR: HCPCS

## 2018-06-02 PROCEDURE — 25000132 ZZH RX MED GY IP 250 OP 250 PS 637: Performed by: INTERNAL MEDICINE

## 2018-06-02 PROCEDURE — 80048 BASIC METABOLIC PNL TOTAL CA: CPT | Performed by: PHYSICIAN ASSISTANT

## 2018-06-02 RX ORDER — SULFAMETHOXAZOLE/TRIMETHOPRIM 800-160 MG
1 TABLET ORAL 2 TIMES DAILY
Qty: 14 TABLET | Refills: 0 | Status: SHIPPED | OUTPATIENT
Start: 2018-06-02

## 2018-06-02 RX ADMIN — CITALOPRAM HYDROBROMIDE 40 MG: 20 TABLET ORAL at 09:15

## 2018-06-02 RX ADMIN — LOSARTAN POTASSIUM 50 MG: 50 TABLET ORAL at 09:15

## 2018-06-02 RX ADMIN — SENNOSIDES AND DOCUSATE SODIUM 1 TABLET: 8.6; 5 TABLET ORAL at 09:15

## 2018-06-02 RX ADMIN — ATORVASTATIN CALCIUM 20 MG: 20 TABLET, FILM COATED ORAL at 09:15

## 2018-06-02 RX ADMIN — NICOTINE 1 PATCH: 21 PATCH, EXTENDED RELEASE TRANSDERMAL at 09:18

## 2018-06-02 ASSESSMENT — PAIN DESCRIPTION - DESCRIPTORS: DESCRIPTORS: CRAMPING;INTERMITTENT;SHARP

## 2018-06-02 NOTE — PLAN OF CARE
Problem: Patient Care Overview  Goal: Plan of Care/Patient Progress Review  Outcome: No Change   A& O x 4, up in room, VSS except tmas 99.6, gave tylenol w/good results. Becerra patent w/good urine output. Abd lap sites w/steri strips intact. Plan for possible d/c today w/becerra and follow-up outpt for TOV if labs normalize and remains afebrile.

## 2018-06-02 NOTE — PROGRESS NOTES
UROLOGY BRIEF NOTE  RESTING COMFORTABLY, V.S.S., FEELS GOOD, NO PAIN. WBC IMPROVED TO 15K. URINE CLEAR.  P.E.- NAD, VERY COMFORTABLE.  A- PROSTATE CA, S/P RALP, UTI, RETENTION  P-CONTINUE WITH GIBBONS, SEE DR ERIC IN 1 WK. BACTRIM DS BID. REG DIET.

## 2018-06-02 NOTE — PLAN OF CARE
Problem: Patient Care Overview  Goal: Plan of Care/Patient Progress Review  Outcome: Adequate for Discharge Date Met: 06/02/18  Pt is A and O X4. VSS. Denies pain, and in stable conditions. WBC improving significantly today. Abd lab sites: steri strips intact, and skin around is WDL. Medina patent, clear yellow urine noted. Urine output of 600 ml. Pt in stable conditions ready for discharge. Discharge education completed this shift. Pt to go home with Medina cath: will have it removed in Dr. Carmona office in a week. Medina care for home education completed. Video was provided, and printout on Home Medina care provided. All questions answered, and Medina bag change was demonstrated to pt with pt being able to demonstrate teachings back. Pt in stable conditions at time of discharge. Denies pain. Pt going home with antibiotics. Script sent to [facility] in [place], on  [facility]. Significant other at bedside while discharge instructions completed with all questions answered. Pt to pick med's up from pharmacy and start first dose tonight. Instructed to take 2 times per day for 14 days, per MD order. Pt and family expressed understanding. Family to give pt ride at home. Iv removed. Pt transported to door # 6 by transport NA, in stable conditions.

## 2018-06-11 NOTE — DISCHARGE SUMMARY
"Discharge Summary    Kandi Ozuna MRN# [id number]   YOB: 1960 Age: 57 year old     Date of Admission:  6/1/2018  Date of Discharge:  6/11/2018  Admitting Physician:  Brandon Calvo MD  Discharge Physician:  Kevin Bernstein MD  Discharging Service:  Hospitalist     Primary Provider: [place]          Admission Diagnoses:   Abdominal Pain  Cystitis  Urinary retention          Discharge Diagnosis:   Patient Active Problem List   Diagnosis     Cystitis     Urinary retention             Condition on Discharge:       Discharge vitals: Blood pressure 111/73, pulse 82, temperature 98.9  F (37.2  C), temperature source Oral, resp. rate 16, height 1.651 m (5' 5\"), weight 65 kg (143 lb 6.4 oz), SpO2 95 %.         I did not see or evaluate this patient on the day of discharge.    # Discharge Pain Plan:   I did not see this patient on day of discharge.  The patient's pain plan was managed by the Urology.            Procedures / Labs / Imaging:   Most Recent 3 CBC's:  Recent Labs   Lab Test  06/02/18   0628  06/01/18   1040   WBC  15.3*  24.8*   HGB  11.5*  13.0*   MCV  91  90   PLT  302  335      Most Recent 3 BMP's:  Recent Labs   Lab Test  06/02/18   0628  06/01/18   1040   NA  139  135   POTASSIUM  3.8  4.1   CHLORIDE  106  103   CO2  24  23   BUN  15  20   CR  0.83  1.24   ANIONGAP  9  9   GLADYS  8.8  8.7   GLC  97  124*     Most Recent 3 Troponin's:No lab results found.    Invalid input(s): TROP, TROPONINIES  Most Recent 3 INR's:No lab results found.  Most Recent 2 LFT's:  Recent Labs   Lab Test  06/01/18   1040   AST  16   ALT  37   ALKPHOS  93   BILITOTAL  0.7     Most Recent Cholesterol Panel:No lab results found.  Most Recent 6 Bacteria Isolates From Any Culture (See EPIC Reports for Culture Details):No lab results found.  Most Recent TSH, T4 and HgbA1c: No lab results found.  No results found for this or any previous visit.          Medications Prior to Admission:     No prescriptions prior " to admission.             Discharge Medications:     Discharge Medication List as of 6/2/2018 11:47 AM      START taking these medications    Details   sulfamethoxazole-trimethoprim (BACTRIM DS/SEPTRA DS) 800-160 MG per tablet Take 1 tablet by mouth 2 times daily, Disp-14 tablet, R-0, E-Prescribe         CONTINUE these medications which have NOT CHANGED    Details   ATORVASTATIN CALCIUM PO Take 20 mg by mouth every evening, Historical      CITALOPRAM HYDROBROMIDE PO Take 40 mg by mouth every evening, Historical      HYDROcodone-acetaminophen (NORCO) 5-325 MG per tablet Take 1 tablet by mouth every 6 hours as needed for pain, Historical      LOSARTAN POTASSIUM PO Take 50 mg by mouth daily, Historical                   Brief History of Illness:   Kandi Ozuna is a 57 year old male who was admitted for hematuria.          Hospital Course:   The patient was seen by Urology and a becerra placed with 400 cc of urine output.  He was started on Rocephin for UTI.  The patient was observed overnight.  Urology evaluated the patient in the morning.  He was discharged with a becerra in place.  He will complete a course of Bactrim and follow up with Dr. Guaman from Urology.             Pending Results:   Unresulted Labs Ordered in the Past 30 Days of this Admission     No orders found from 4/2/2018 to 6/2/2018.